# Patient Record
(demographics unavailable — no encounter records)

---

## 2018-09-19 NOTE — MMO
BILATERAL SCREENING MAMMOGRAM:

 

Date:  09/14/18 

 

COMPARISON:  

09/13/17, 06/17/16, 01/09/15. 

 

HISTORY:  

Annual screening exam. 

 

This patient's mammogram was interpreted with the assistance of computer-aided detection.  

 

FINDINGS:

Scattered fibroglandular changes of both breasts are noted. Intramammary nodes are present bilaterall
y. These appear stable. No suspicious mass, microcalcifications, or other signs of malignancy. 

 

IMPRESSION: 

 

BIRADS 2:  Benign Finding(s) 

 

POS: [facility]